# Patient Record
Sex: FEMALE | URBAN - METROPOLITAN AREA
[De-identification: names, ages, dates, MRNs, and addresses within clinical notes are randomized per-mention and may not be internally consistent; named-entity substitution may affect disease eponyms.]

---

## 2023-10-27 ENCOUNTER — OFFICE VISIT (OUTPATIENT)
Dept: URBAN - METROPOLITAN AREA CLINIC 54 | Facility: CLINIC | Age: 58
End: 2023-10-27
Payer: COMMERCIAL

## 2023-10-27 VITALS
TEMPERATURE: 97.8 F | WEIGHT: 194 LBS | HEIGHT: 62 IN | BODY MASS INDEX: 35.7 KG/M2 | DIASTOLIC BLOOD PRESSURE: 72 MMHG | SYSTOLIC BLOOD PRESSURE: 154 MMHG | HEART RATE: 66 BPM

## 2023-10-27 DIAGNOSIS — K59.01 CONSTIPATION: ICD-10-CM

## 2023-10-27 DIAGNOSIS — Z53.20 COLON CANCER SCREENING DECLINED: ICD-10-CM

## 2023-10-27 DIAGNOSIS — R14.0 ABDOMINAL BLOATING: ICD-10-CM

## 2023-10-27 PROBLEM — 441530006: Status: ACTIVE | Noted: 2023-10-27

## 2023-10-27 PROBLEM — 14760008: Status: ACTIVE | Noted: 2023-10-27

## 2023-10-27 PROCEDURE — 99203 OFFICE O/P NEW LOW 30 MIN: CPT

## 2023-10-27 PROCEDURE — 99243 OFF/OP CNSLTJ NEW/EST LOW 30: CPT

## 2023-10-27 RX ORDER — METOPROLOL TARTRATE 25 MG/1
1 TABLET WITH FOOD TABLET, FILM COATED ORAL TWICE A DAY
Status: ACTIVE | COMMUNITY

## 2023-10-27 RX ORDER — GLIMEPIRIDE 4 MG/1
1 TABLET WITH BREAKFAST OR THE FIRST MAIN MEAL OF THE DAY TABLET ORAL ONCE A DAY
Status: ACTIVE | COMMUNITY

## 2023-10-27 RX ORDER — ROSUVASTATIN CALCIUM 10 MG/1
1 TABLET TABLET, COATED ORAL ONCE A DAY
Status: ACTIVE | COMMUNITY

## 2023-10-27 RX ORDER — LEVOTHYROXINE SODIUM 137 UG/1
1 TABLET IN THE MORNING ON AN EMPTY STOMACH TABLET ORAL ONCE A DAY
Status: ACTIVE | COMMUNITY

## 2023-10-27 RX ORDER — OMEPRAZOLE 20 MG/1
1 CAPSULE 30 MINUTES BEFORE MORNING MEAL CAPSULE, DELAYED RELEASE ORAL ONCE A DAY
Status: ACTIVE | COMMUNITY

## 2023-10-27 RX ORDER — FERROUS SULFATE TAB EC 325 MG (65 MG FE EQUIVALENT) 325 (65 FE) MG
1 TABLET TABLET DELAYED RESPONSE ORAL
Status: ON HOLD | COMMUNITY

## 2023-10-27 NOTE — HPI-TODAY'S VISIT:
10/27/23: Patient was referred by DELANEY Parra for abdominal pain. A copy of this document will be sent to the provider. Pt is a 57 yo female with a PMH of DM, HTN, CKD3b, CHF, PE in 2018 completed AC therapy, and hx of papillary thyroid cancer s/p total thyroidectomy who presents for abd pain and constipation. Pt states that since having cholecystectomy in 7/2022 she has severe postprandial bloating and distension. No significant abd pain, but it does cause generalized abd discomfort. Pt may go 3 days without a BM, then go once daily for a few days, but stools are often hard and pebble like with straining to defecate. No rectal bleeding. No unintentional weight loss. No family history of colon cancer.  Labs 10/25 with normocytic anemia Hgb 9.9, MCV 85.5. TSH low at 0.2. LFTs WNL.  Colonoscopy 12/12/2018: The rectal exam was normal. The bowel preparation was suboptimal for small polyps, but with vigorous irrigation and suction, these were adequate for large polyps or mass lesions. None were seen. There are internal hemorrhoids.

## 2024-01-02 ENCOUNTER — OFFICE VISIT (OUTPATIENT)
Dept: URBAN - METROPOLITAN AREA CLINIC 54 | Facility: CLINIC | Age: 59
End: 2024-01-02

## 2024-01-03 ENCOUNTER — DASHBOARD ENCOUNTERS (OUTPATIENT)
Age: 59
End: 2024-01-03

## 2024-01-03 ENCOUNTER — OFFICE VISIT (OUTPATIENT)
Dept: URBAN - METROPOLITAN AREA CLINIC 54 | Facility: CLINIC | Age: 59
End: 2024-01-03
Payer: COMMERCIAL

## 2024-01-03 VITALS
HEIGHT: 62 IN | TEMPERATURE: 97.1 F | SYSTOLIC BLOOD PRESSURE: 124 MMHG | HEART RATE: 67 BPM | BODY MASS INDEX: 35.51 KG/M2 | WEIGHT: 193 LBS | DIASTOLIC BLOOD PRESSURE: 70 MMHG

## 2024-01-03 DIAGNOSIS — R14.0 ABDOMINAL BLOATING: ICD-10-CM

## 2024-01-03 DIAGNOSIS — K59.01 CONSTIPATION: ICD-10-CM

## 2024-01-03 PROCEDURE — 99213 OFFICE O/P EST LOW 20 MIN: CPT

## 2024-01-03 RX ORDER — METOPROLOL TARTRATE 25 MG/1
1 TABLET WITH FOOD TABLET, FILM COATED ORAL TWICE A DAY
Status: ACTIVE | COMMUNITY

## 2024-01-03 RX ORDER — OMEPRAZOLE 20 MG/1
1 CAPSULE 30 MINUTES BEFORE MORNING MEAL CAPSULE, DELAYED RELEASE ORAL ONCE A DAY
Status: ACTIVE | COMMUNITY

## 2024-01-03 RX ORDER — LEVOTHYROXINE SODIUM 137 UG/1
1 TABLET IN THE MORNING ON AN EMPTY STOMACH TABLET ORAL ONCE A DAY
Status: ACTIVE | COMMUNITY

## 2024-01-03 RX ORDER — GLIMEPIRIDE 4 MG/1
1 TABLET WITH BREAKFAST OR THE FIRST MAIN MEAL OF THE DAY TABLET ORAL ONCE A DAY
Status: ACTIVE | COMMUNITY

## 2024-01-03 RX ORDER — FERROUS SULFATE TAB EC 325 MG (65 MG FE EQUIVALENT) 325 (65 FE) MG
1 TABLET TABLET DELAYED RESPONSE ORAL
Status: ON HOLD | COMMUNITY

## 2024-01-03 RX ORDER — ROSUVASTATIN CALCIUM 10 MG/1
1 TABLET TABLET, COATED ORAL ONCE A DAY
Status: ACTIVE | COMMUNITY

## 2024-01-03 NOTE — HPI-TODAY'S VISIT:
10/27/23: Patient was referred by DELANEY Parra for abdominal pain. A copy of this document will be sent to the provider. Pt is a 57 yo female with a PMH of DM, HTN, CKD3b, CHF, PE in 2018 completed AC therapy, and hx of papillary thyroid cancer s/p total thyroidectomy who presents for abd pain and constipation. Pt states that since having cholecystectomy in 7/2022 she has severe postprandial bloating and distension. No significant abd pain, but it does cause generalized abd discomfort. Pt may go 3 days without a BM, then go once daily for a few days, but stools are often hard and pebble like with straining to defecate. No rectal bleeding. No unintentional weight loss. No family history of colon cancer.  Labs 10/25 with normocytic anemia Hgb 9.9, MCV 85.5. TSH low at 0.2. LFTs WNL.  Colonoscopy 12/12/2018: The rectal exam was normal. The bowel preparation was suboptimal for small polyps, but with vigorous irrigation and suction, these were adequate for large polyps or mass lesions. None were seen. There are internal hemorrhoids.  1/3/24 Follow Up: Patient presents for follow up of constipation. Taking Metamucil 1 tsp once daily and having a BM almost every day, sometimes every other day. Feeling much better overall. Has some discomfort if she doesn't have a BM daily. Blotaing has resolved. Not ready to schedule cscope.